# Patient Record
Sex: FEMALE | Race: WHITE | NOT HISPANIC OR LATINO | Employment: OTHER | URBAN - METROPOLITAN AREA
[De-identification: names, ages, dates, MRNs, and addresses within clinical notes are randomized per-mention and may not be internally consistent; named-entity substitution may affect disease eponyms.]

---

## 2021-08-25 ENCOUNTER — OFFICE VISIT (OUTPATIENT)
Dept: INTERNAL MEDICINE CLINIC | Facility: CLINIC | Age: 56
End: 2021-08-25
Payer: MEDICARE

## 2021-08-25 VITALS
OXYGEN SATURATION: 96 % | HEART RATE: 82 BPM | DIASTOLIC BLOOD PRESSURE: 68 MMHG | BODY MASS INDEX: 46.07 KG/M2 | SYSTOLIC BLOOD PRESSURE: 116 MMHG | HEIGHT: 61 IN | TEMPERATURE: 97.8 F | WEIGHT: 244 LBS

## 2021-08-25 DIAGNOSIS — Z00.00 MEDICARE ANNUAL WELLNESS VISIT, SUBSEQUENT: ICD-10-CM

## 2021-08-25 DIAGNOSIS — E78.2 MIXED HYPERLIPIDEMIA: Primary | ICD-10-CM

## 2021-08-25 DIAGNOSIS — F20.0 PARANOID SCHIZOPHRENIA, SUBCHRONIC CONDITION (HCC): ICD-10-CM

## 2021-08-25 DIAGNOSIS — M17.9 OSTEOARTHRITIS OF KNEE, UNSPECIFIED: ICD-10-CM

## 2021-08-25 DIAGNOSIS — R73.01 IMPAIRED FASTING GLUCOSE: ICD-10-CM

## 2021-08-25 DIAGNOSIS — N61.0 CELLULITIS OF LEFT BREAST: ICD-10-CM

## 2021-08-25 PROCEDURE — 99214 OFFICE O/P EST MOD 30 MIN: CPT | Performed by: INTERNAL MEDICINE

## 2021-08-25 PROCEDURE — G0438 PPPS, INITIAL VISIT: HCPCS | Performed by: INTERNAL MEDICINE

## 2021-08-25 RX ORDER — CEPHALEXIN 500 MG/1
500 CAPSULE ORAL EVERY 8 HOURS SCHEDULED
Qty: 21 CAPSULE | Refills: 0 | Status: SHIPPED | OUTPATIENT
Start: 2021-08-25 | End: 2021-09-01

## 2021-08-25 RX ORDER — LISINOPRIL 10 MG/1
10 TABLET ORAL DAILY
COMMUNITY
End: 2021-08-25

## 2021-08-25 NOTE — PROGRESS NOTES
Assessment and Plan:     Problem List Items Addressed This Visit        Endocrine    Impaired fasting glucose       Musculoskeletal and Integument    Osteoarthritis of knee, unspecified       Other    Paranoid schizophrenia, subchronic condition ([facility])    Relevant Medications    Invega Trinza 410 MG/1 315ML FLACO    paliperidone palmitate  MG/1 315ML FLACO    Mixed hyperlipidemia - Primary    Cellulitis of left breast    Relevant Medications    cephalexin (KEFLEX) 500 mg capsule    Medicare annual wellness visit, subsequent           Preventive health issues were discussed with patient, and age appropriate screening tests were ordered as noted in patient's After Visit Summary  Personalized health advice and appropriate referrals for health education or preventive services given if needed, as noted in patient's After Visit Summary       History of Present Illness:     Patient presents for Medicare Annual Wellness visit    No care team member to display     Problem List:     Patient Active Problem List   Diagnosis    Impaired fasting glucose    Osteoarthritis of knee, unspecified    Depression with anxiety    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[facility] )    Paranoid schizophrenia, subchronic condition ([facility] )    Mixed hyperlipidemia    Cellulitis of left breast    Medicare annual wellness visit, subsequent      Past Medical and Surgical History:     Past Medical History:   Diagnosis Date    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     Body mass index 50 0-59 9, adult ([facility] )     Depression with anxiety     Essential hypertension, benign     Impaired fasting glucose     Obesity due to excess calories     Osteoarthritis of knee, unspecified     Other hyperlipidemia     Other schizoaffective disorders ([facility] )     [facility]     Subchronic schizophrenia ([facility] )      Past Surgical History:   Procedure Laterality Date    NOSE SURGERY        Family History:     Family History   Problem Relation Age of Onset    Hyperlipidemia Sister       Social History:     Social History     Socioeconomic History    Marital status: Single     Spouse name: None    Number of children: None    Years of education: None    Highest education level: None   Occupational History    None   Tobacco Use    Smoking status: Never Smoker    Smokeless tobacco: Never Used   Substance and Sexual Activity    Alcohol use: Not Currently    Drug use: None    Sexual activity: None   Other Topics Concern    None   Social History Narrative    None     Social Determinants of Health     Financial Resource Strain:     Difficulty of Paying Living Expenses:    Food Insecurity:     Worried About Running Out of Food in the Last Year:     Ran Out of Food in the Last Year:    Transportation Needs:     Lack of Transportation (Medical):  Lack of Transportation (Non-Medical):    Physical Activity:     Days of Exercise per Week:     Minutes of Exercise per Session:    Stress:     Feeling of Stress :    Social Connections:     Frequency of Communication with Friends and Family:     Frequency of Social Gatherings with Friends and Family:     Attends [employer] Services:     Active Member of Clubs or Organizations:     Attends Club or Organization Meetings:     Marital Status:    Intimate Partner Violence:     Fear of Current or Ex-Partner:     Emotionally Abused:     Physically Abused:     Sexually Abused:       Medications and Allergies:     Current Outpatient Medications   Medication Sig Dispense Refill    Invega Trinza 410 MG/1 315ML FLACO       paliperidone palmitate  MG/1 315ML FLACO Inject 410 mg into a muscle      cephalexin (KEFLEX) 500 mg capsule Take 1 capsule (500 mg total) by mouth every 8 (eight) hours for 7 days 21 capsule 0    lisinopril (ZESTRIL) 10 mg tablet Take 10 mg by mouth daily (Patient not taking: Reported on 8/25/2021)       No current facility-administered medications for this visit       Allergies Allergen Reactions    Erythromycin Base Other (See Comments)     Unknown    Penicillins Other (See Comments)     Unknown      Immunizations:     Immunization History   Administered Date(s) Administered    Sars-cov-2 / Covid-19 vector-nr, [id number] vaccine Layman Fabienne / [employer] Blackburn & [employer]burn) 05/25/2021    Tdap 08/17/2011      Health Maintenance:         Topic Date Due    Hepatitis C Screening  Never done    HIV Screening  Never done    Cervical Cancer Screening  Never done    Breast Cancer Screening: Mammogram  Never done    Colorectal Cancer Screening  Never done         Topic Date Due    DTaP,Tdap,and Td Vaccines (2 - Td or Tdap) 08/17/2021    Influenza Vaccine (1) 09/01/2021      Medicare Health Risk Assessment:     /68 (BP Location: Right arm, Patient Position: Sitting, Cuff Size: Standard)   Pulse 82   Temp 97 8 °F (36 6 °C) (Temporal)   Ht 5' 1" (1 549 m)   Wt 111 kg (244 lb)   SpO2 96%   BMI 46 10 kg/m²      Flex Ceja is here for her Subsequent Wellness visit  Last Medicare Wellness visit information reviewed, patient interviewed and updates made to the record today  Health Risk Assessment:   Patient rates overall health as excellent  Patient feels that their physical health rating is same  Patient is very satisfied with their life  Eyesight was rated as same  Hearing was rated as same  Patient feels that their emotional and mental health rating is same  Patients states they are never, rarely angry  Patient states they are never, rarely unusually tired/fatigued  Pain experienced in the last 7 days has been some  Patient's pain rating has been 7/10  Patient states that she has experienced no weight loss or gain in last 6 months  Depression Screening:   PHQ-2 Score: 0      Fall Risk Screening: In the past year, patient has experienced: no history of falling in past year      Urinary Incontinence Screening:   Patient has not leaked urine accidently in the last six months       Home Safety:  Patient has trouble with stairs inside or outside of their home  Patient has working smoke alarms and has working carbon monoxide detector  Home safety hazards include: none  Nutrition:   Current diet is Low Saturated Fat and Regular  Medications:   Patient is currently taking over-the-counter supplements  OTC medications include: see medication list  Patient is able to manage medications  Activities of Daily Living (ADLs)/Instrumental Activities of Daily Living (IADLs):   Walk and transfer into and out of bed and chair?: Yes  Dress and groom yourself?: Yes    Bathe or shower yourself?: Yes    Feed yourself? Yes  Do your laundry/housekeeping?: Yes  Manage your money, pay your bills and track your expenses?: No  Make your own meals?: Yes    Do your own shopping?: Yes    Previous Hospitalizations:   Any hospitalizations or ED visits within the last 12 months?: No      Advance Care Planning:   Living will: No    Durable POA for healthcare: No    Advanced directive: No    Five wishes given: No      PREVENTIVE SCREENINGS      Cardiovascular Screening:    General: Screening Current      Lung Cancer Screening:     General: Screening Not Indicated    Screening, Brief Intervention, and Referral to Treatment (SBIRT)    Screening  Typical number of drinks in a day: 0  Typical number of drinks in a week: 0  Interpretation: Low risk drinking behavior      Single Item Drug Screening:  How often have you used an illegal drug (including marijuana) or a prescription medication for non-medical reasons in the past year? never    Single Item Drug Screen Score: 0  Interpretation: Negative screen for possible drug use disorder      Wagner King MD

## 2021-08-25 NOTE — PROGRESS NOTES
Assessment/Plan:    BMI Counseling: Body mass index is 46 1 kg/m²  The BMI is above normal  Nutrition recommendations include decreasing portion sizes, encouraging healthy choices of fruits and vegetables and decreasing fast food intake  Exercise recommendations include moderate physical activity 150 minutes/week  1  Mixed hyperlipidemia  -     Comprehensive metabolic panel; Future  -     Lipid Panel with Direct LDL reflex; Future  -     TSH, 3rd generation; Future    2  Impaired fasting glucose  -     CBC and differential; Future  -     Hemoglobin A1C; Future    3  Medicare annual wellness visit, subsequent    4  Paranoid schizophrenia, subchronic condition ([facility] )    5  Osteoarthritis of knee, unspecified    6  Cellulitis of left breast  -     cephalexin (KEFLEX) 500 mg capsule; Take 1 capsule (500 mg total) by mouth every 8 (eight) hours for 7 days           Subjective:      Patient ID: Sukhjinder Salazar is a 64 y o  female  Left breast skin check, no fever, also need medical wellness exam      The following portions of the patient's history were reviewed and updated as appropriate: She  has a past medical history of 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 ), Body mass index 50 0-59 9, adult ([facility] ), Depression with anxiety, Essential hypertension, benign, Impaired fasting glucose, Obesity due to excess calories, Osteoarthritis of knee, unspecified, Other hyperlipidemia, Other schizoaffective disorders ([facility] ), Paranoid schizophrenia, subchronic condition ([facility] ), and Subchronic schizophrenia ([facility] )    She   Patient Active Problem List    Diagnosis Date Noted    Mixed hyperlipidemia 08/25/2021    Cellulitis of left breast 08/25/2021    Medicare annual wellness visit, subsequent 08/25/2021    Impaired fasting glucose     Osteoarthritis of knee, unspecified     Depression with anxiety     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     Paranoid schizophrenia, subchronic condition ([facility] )      She  has a past surgical history that includes Nose surgery  Her family history includes Hyperlipidemia in her sister  She  reports that she has never smoked  She has never used smokeless tobacco  She reports previous alcohol use  No history on file for drug use  Current Outpatient Medications   Medication Sig Dispense Refill    Invega Trinza 410 MG/1 315ML FLACO       paliperidone palmitate  MG/1 315ML FLACO Inject 410 mg into a muscle      cephalexin (KEFLEX) 500 mg capsule Take 1 capsule (500 mg total) by mouth every 8 (eight) hours for 7 days 21 capsule 0     No current facility-administered medications for this visit  Current Outpatient Medications on File Prior to Visit   Medication Sig    Invega Trinza 410 MG/1 315ML FLACO     paliperidone palmitate  MG/1 315ML FLACO Inject 410 mg into a muscle    [DISCONTINUED] lisinopril (ZESTRIL) 10 mg tablet Take 10 mg by mouth daily (Patient not taking: Reported on 8/25/2021)     No current facility-administered medications on file prior to visit  She is allergic to erythromycin base and penicillins       Review of Systems   Constitutional: Negative for chills and fever  HENT: Negative for congestion, ear pain and sore throat  Eyes: Negative for pain  Respiratory: Negative for cough and shortness of breath  Cardiovascular: Negative for chest pain and leg swelling  Gastrointestinal: Negative for abdominal pain, nausea and vomiting  Endocrine: Negative for polyuria  Genitourinary: Negative for difficulty urinating, frequency and urgency  Musculoskeletal: Negative for arthralgias and back pain  Skin: Positive for rash  Neurological: Negative for weakness and headaches  Psychiatric/Behavioral: Negative for sleep disturbance  The patient is not nervous/anxious            Objective:      /68 (BP Location: Right arm, Patient Position: Sitting, Cuff Size: Standard)   Pulse 82   Temp 97 8 °F (36 6 °C) (Temporal)   Ht 5' 1" (1 549 m)   Wt 111 kg (244 lb)   SpO2 96%   BMI 46 10 kg/m²     No results found for this or any previous visit (from the past 1344 hour(s))  Physical Exam  Constitutional:       Appearance: Normal appearance  HENT:      Head: Normocephalic  Right Ear: Tympanic membrane, ear canal and external ear normal       Left Ear: Tympanic membrane, ear canal and external ear normal       Nose: Nose normal  No congestion  Mouth/Throat:      Mouth: Mucous membranes are moist       Pharynx: Oropharynx is clear  No oropharyngeal exudate or posterior oropharyngeal erythema  Eyes:      Extraocular Movements: Extraocular movements intact  Conjunctiva/sclera: Conjunctivae normal       Pupils: Pupils are equal, round, and reactive to light  Cardiovascular:      Rate and Rhythm: Normal rate and regular rhythm  Heart sounds: Normal heart sounds  No murmur heard  Pulmonary:      Effort: Pulmonary effort is normal       Breath sounds: Normal breath sounds  No wheezing or rales  Abdominal:      General: Bowel sounds are normal  There is no distension  Palpations: Abdomen is soft  Tenderness: There is no abdominal tenderness  Musculoskeletal:         General: Normal range of motion  Cervical back: Normal range of motion and neck supple  Right lower leg: No edema  Left lower leg: No edema  Lymphadenopathy:      Cervical: No cervical adenopathy  Skin:     General: Skin is warm  Comments: Left-side of the breast laterally 1 x 1 cm open wound present, dry, no active discharge, minimal redness present   Neurological:      General: No focal deficit present  Mental Status: She is alert and oriented to person, place, and time

## 2021-09-08 LAB
ALBUMIN SERPL-MCNC: 3.8 G/DL (ref 3.6–5.1)
ALBUMIN/GLOB SERPL: 1.3 (CALC) (ref 1–2.5)
ALP SERPL-CCNC: 78 U/L (ref 37–153)
ALT SERPL-CCNC: 15 U/L (ref 6–29)
AST SERPL-CCNC: 13 U/L (ref 10–35)
BASOPHILS # BLD AUTO: 33 CELLS/UL (ref 0–200)
BASOPHILS NFR BLD AUTO: 0.5 %
BILIRUB SERPL-MCNC: 0.6 MG/DL (ref 0.2–1.2)
BUN SERPL-MCNC: 16 MG/DL (ref 7–25)
BUN/CREAT SERPL: ABNORMAL (CALC) (ref 6–22)
CALCIUM SERPL-MCNC: 8.9 MG/DL (ref 8.6–10.4)
CHLORIDE SERPL-SCNC: 103 MMOL/L (ref 98–110)
CHOLEST SERPL-MCNC: 249 MG/DL
CHOLEST/HDLC SERPL: 6.9 (CALC)
CO2 SERPL-SCNC: 30 MMOL/L (ref 20–32)
CREAT SERPL-MCNC: 0.71 MG/DL (ref 0.5–1.05)
EOSINOPHIL # BLD AUTO: 211 CELLS/UL (ref 15–500)
EOSINOPHIL NFR BLD AUTO: 3.2 %
ERYTHROCYTE [DISTWIDTH] IN BLOOD BY AUTOMATED COUNT: 12.2 % (ref 11–15)
GLOBULIN SER CALC-MCNC: 2.9 G/DL (CALC) (ref 1.9–3.7)
GLUCOSE SERPL-MCNC: 113 MG/DL (ref 65–99)
HBA1C MFR BLD: 6.1 % OF TOTAL HGB
HCT VFR BLD AUTO: 40.8 % (ref 35–45)
HDLC SERPL-MCNC: 36 MG/DL
HGB BLD-MCNC: 13.7 G/DL (ref 11.7–15.5)
LDLC SERPL CALC-MCNC: 170 MG/DL (CALC)
LYMPHOCYTES # BLD AUTO: 2442 CELLS/UL (ref 850–3900)
LYMPHOCYTES NFR BLD AUTO: 37 %
MCH RBC QN AUTO: 29.7 PG (ref 27–33)
MCHC RBC AUTO-ENTMCNC: 33.6 G/DL (ref 32–36)
MCV RBC AUTO: 88.3 FL (ref 80–100)
MONOCYTES # BLD AUTO: 561 CELLS/UL (ref 200–950)
MONOCYTES NFR BLD AUTO: 8.5 %
NEUTROPHILS # BLD AUTO: 3353 CELLS/UL (ref 1500–7800)
NEUTROPHILS NFR BLD AUTO: 50.8 %
NONHDLC SERPL-MCNC: 213 MG/DL (CALC)
PLATELET # BLD AUTO: 304 THOUSAND/UL (ref 140–400)
PMV BLD REES-ECKER: 11.1 FL (ref 7.5–12.5)
POTASSIUM SERPL-SCNC: 4.7 MMOL/L (ref 3.5–5.3)
PROT SERPL-MCNC: 6.7 G/DL (ref 6.1–8.1)
RBC # BLD AUTO: 4.62 MILLION/UL (ref 3.8–5.1)
SL AMB EGFR AFRICAN AMERICAN: 110 ML/MIN/1.73M2
SL AMB EGFR NON AFRICAN AMERICAN: 95 ML/MIN/1.73M2
SODIUM SERPL-SCNC: 140 MMOL/L (ref 135–146)
TRIGL SERPL-MCNC: 265 MG/DL
TSH SERPL-ACNC: 3.11 MIU/L (ref 0.4–4.5)
WBC # BLD AUTO: 6.6 THOUSAND/UL (ref 3.8–10.8)

## 2021-09-15 ENCOUNTER — OFFICE VISIT (OUTPATIENT)
Dept: INTERNAL MEDICINE CLINIC | Facility: CLINIC | Age: 56
End: 2021-09-15
Payer: MEDICARE

## 2021-09-15 VITALS
SYSTOLIC BLOOD PRESSURE: 122 MMHG | BODY MASS INDEX: 45.31 KG/M2 | DIASTOLIC BLOOD PRESSURE: 78 MMHG | WEIGHT: 240 LBS | HEIGHT: 61 IN | TEMPERATURE: 97.7 F

## 2021-09-15 DIAGNOSIS — R73.01 IMPAIRED FASTING GLUCOSE: ICD-10-CM

## 2021-09-15 DIAGNOSIS — E78.2 MIXED HYPERLIPIDEMIA: Primary | ICD-10-CM

## 2021-09-15 DIAGNOSIS — F20.0 PARANOID SCHIZOPHRENIA, SUBCHRONIC CONDITION (HCC): ICD-10-CM

## 2021-09-15 DIAGNOSIS — M17.9 OSTEOARTHRITIS OF KNEE, UNSPECIFIED: ICD-10-CM

## 2021-09-15 PROCEDURE — 99214 OFFICE O/P EST MOD 30 MIN: CPT | Performed by: INTERNAL MEDICINE

## 2021-09-15 NOTE — PROGRESS NOTES
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[facility] ), Body mass index 50 0-59 9, [facility] ), Colon cancer screening declined, Depression with anxiety, Essential hypertension, benign, Hypertension, Impaired fasting glucose, Obesity due to excess calories, Osteoarthritis of knee, unspecified, Other hyperlipidemia, Other schizoaffective disorders ([facility] ), Paranoid schizophrenia, subchronic condition ([facility] ), Screening mammography declined, and Subchronic schizophrenia ([facility] )  She   Patient Active Problem List    Diagnosis Date Noted    Mixed hyperlipidemia 08/25/2021    Cellulitis of left breast 08/25/2021    Medicare annual wellness visit, subsequent 08/25/2021    Impaired fasting glucose     Osteoarthritis of knee, unspecified     Depression with anxiety     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[facility] )     Paranoid schizophrenia, subchronic condition ([facility] )      She  has a past surgical history that includes Nose surgery  Her family history includes Hyperlipidemia in her sister  She  reports that she has never smoked  She has never used smokeless tobacco  She reports previous alcohol use  No history on file for drug use  Current Outpatient Medications   Medication Sig Dispense Refill    Invega Trinza 410 MG/1 315ML FLACO        No current facility-administered medications for this visit       Current Outpatient Medications on File Prior to Visit   Medication Sig    Invega Trinza 410 MG/1 315ML FLACO     [DISCONTINUED] paliperidone palmitate  MG/1 315ML FLACO Inject 410 mg into a muscle     No current facility-administered medications on file prior to visit  She is allergic to erythromycin base and penicillins       Review of Systems   Constitutional: Negative for chills and fever  HENT: Negative for congestion, ear pain and sore throat  Eyes: Negative for pain  Respiratory: Negative for cough and shortness of breath  Cardiovascular: Negative for chest pain and leg swelling  Gastrointestinal: Negative for abdominal pain, nausea and vomiting  Endocrine: Negative for polyuria  Genitourinary: Negative for difficulty urinating, frequency and urgency  Musculoskeletal: Positive for arthralgias  Negative for back pain  Skin: Negative for rash  Neurological: Negative for weakness and headaches  Psychiatric/Behavioral: Negative for sleep disturbance  The patient is not nervous/anxious            Objective:      /78 (BP Location: Right arm, Patient Position: Sitting, Cuff Size: Standard)   Temp 97 7 °F (36 5 °C) (Temporal)   Ht 5' 1" (1 549 m)   Wt 109 kg (240 lb)   BMI 45 35 kg/m²     Recent Results (from the past 1344 hour(s))   Lipid Panel with Direct LDL reflex    Collection Time: 09/07/21  8:17 AM   Result Value Ref Range    Total Cholesterol 249 (H) <200 mg/dL    HDL 36 (L) > OR = 50 mg/dL    Triglycerides 265 (H) <150 mg/dL    LDL Calculated 170 (H) mg/dL (calc)    Chol HDLC Ratio 6 9 (H) <5 0 (calc)    Non-HDL Cholesterol 213 (H) <130 mg/dL (calc)   Comprehensive metabolic panel    Collection Time: 09/07/21  8:17 AM   Result Value Ref Range    Glucose, Random 113 (H) 65 - 99 mg/dL    BUN 16 7 - 25 mg/dL    Creatinine 0 71 0 50 - 1 05 mg/dL    eGFR Non  95 > OR = 60 mL/min/1 73m2    eGFR  110 > OR = 60 mL/min/1 73m2    SL AMB BUN/CREATININE RATIO NOT APPLICABLE 6 - 22 (calc)    Sodium 140 135 - 146 mmol/L    Potassium 4 7 3 5 - 5 3 mmol/L    Chloride 103 98 - 110 mmol/L    CO2 30 20 - 32 mmol/L    Calcium 8 9 8 6 - 10 4 mg/dL Protein, Total 6 7 6 1 - 8 1 g/dL    Albumin 3 8 3 6 - 5 1 g/dL    Globulin 2 9 1 9 - 3 7 g/dL (calc)    Albumin/Globulin Ratio 1 3 1 0 - 2 5 (calc)    TOTAL BILIRUBIN 0 6 0 2 - 1 2 mg/dL    Alkaline Phosphatase 78 37 - 153 U/L    AST 13 10 - 35 U/L    ALT 15 6 - 29 U/L   CBC and differential    Collection Time: 09/07/21  8:17 AM   Result Value Ref Range    White Blood Cell Count 6 6 3 8 - 10 8 Thousand/uL    Red Blood Cell Count 4 62 3 80 - 5 10 Million/uL    Hemoglobin 13 7 11 7 - 15 5 g/dL    HCT 40 8 35 0 - 45 0 %    MCV 88 3 80 0 - 100 0 fL    MCH 29 7 27 0 - 33 0 pg    MCHC 33 6 32 0 - 36 0 g/dL    RDW 12 2 11 0 - 15 0 %    Platelet Count 460 664 - 400 Thousand/uL    SL AMB MPV 11 1 7 5 - 12 5 fL    Neutrophils (Absolute) 3,353 1,500 - 7,800 cells/uL    Lymphocytes (Absolute) 2,442 850 - 3,900 cells/uL    Monocytes (Absolute) 561 200 - 950 cells/uL    Eosinophils (Absolute) 211 15 - 500 cells/uL    Basophils ABS 33 0 - 200 cells/uL    Neutrophils 50 8 %    Lymphocytes 37 0 %    Monocytes 8 5 %    Eosinophils 3 2 %    Basophils PCT 0 5 %   TSH, 3rd generation    Collection Time: 09/07/21  8:17 AM   Result Value Ref Range    TSH 3 11 0 40 - 4 50 mIU/L   Hemoglobin A1c (w/out EAG)    Collection Time: 09/07/21  8:17 AM   Result Value Ref Range    Hemoglobin A1C 6 1 (H) <5 7 % of total Hgb        Physical Exam  Constitutional:       Appearance: Normal appearance  HENT:      Head: Normocephalic  Right Ear: Tympanic membrane, ear canal and external ear normal       Left Ear: Tympanic membrane, ear canal and external ear normal       Nose: Nose normal  No congestion  Mouth/Throat:      Mouth: Mucous membranes are moist       Pharynx: Oropharynx is clear  No oropharyngeal exudate or posterior oropharyngeal erythema  Eyes:      Extraocular Movements: Extraocular movements intact  Conjunctiva/sclera: Conjunctivae normal       Pupils: Pupils are equal, round, and reactive to light     Cardiovascular: Rate and Rhythm: Normal rate and regular rhythm  Heart sounds: Normal heart sounds  No murmur heard  Pulmonary:      Effort: Pulmonary effort is normal       Breath sounds: Normal breath sounds  No wheezing or rales  Abdominal:      General: Bowel sounds are normal  There is no distension  Palpations: Abdomen is soft  Tenderness: There is no abdominal tenderness  Musculoskeletal:         General: Normal range of motion  Cervical back: Normal range of motion and neck supple  Right lower leg: No edema  Left lower leg: No edema  Lymphadenopathy:      Cervical: No cervical adenopathy  Skin:     General: Skin is warm  Neurological:      General: No focal deficit present  Mental Status: She is alert and oriented to person, place, and time

## 2021-11-09 ENCOUNTER — OFFICE VISIT (OUTPATIENT)
Dept: INTERNAL MEDICINE CLINIC | Facility: CLINIC | Age: 56
End: 2021-11-09
Payer: MEDICARE

## 2021-11-09 VITALS
BODY MASS INDEX: 46.33 KG/M2 | DIASTOLIC BLOOD PRESSURE: 78 MMHG | SYSTOLIC BLOOD PRESSURE: 134 MMHG | TEMPERATURE: 97.9 F | HEIGHT: 61 IN | WEIGHT: 245.4 LBS | OXYGEN SATURATION: 96 % | HEART RATE: 97 BPM

## 2021-11-09 DIAGNOSIS — F20.0 PARANOID SCHIZOPHRENIA, SUBCHRONIC CONDITION (HCC): ICD-10-CM

## 2021-11-09 DIAGNOSIS — E78.2 MIXED HYPERLIPIDEMIA: ICD-10-CM

## 2021-11-09 DIAGNOSIS — M17.9 OSTEOARTHRITIS OF KNEE, UNSPECIFIED: ICD-10-CM

## 2021-11-09 DIAGNOSIS — M79.601 RIGHT ARM PAIN: Primary | ICD-10-CM

## 2021-11-09 DIAGNOSIS — R73.01 IMPAIRED FASTING GLUCOSE: ICD-10-CM

## 2021-11-09 DIAGNOSIS — E66.01 OBESITY, MORBID (HCC): ICD-10-CM

## 2021-11-09 DIAGNOSIS — Z12.31 ENCOUNTER FOR SCREENING MAMMOGRAM FOR MALIGNANT NEOPLASM OF BREAST: ICD-10-CM

## 2021-11-09 PROCEDURE — 99214 OFFICE O/P EST MOD 30 MIN: CPT | Performed by: INTERNAL MEDICINE

## 2022-02-22 ENCOUNTER — OFFICE VISIT (OUTPATIENT)
Dept: INTERNAL MEDICINE CLINIC | Facility: CLINIC | Age: 57
End: 2022-02-22
Payer: MEDICARE

## 2022-02-22 VITALS
TEMPERATURE: 97.9 F | HEIGHT: 61 IN | DIASTOLIC BLOOD PRESSURE: 74 MMHG | HEART RATE: 83 BPM | OXYGEN SATURATION: 98 % | SYSTOLIC BLOOD PRESSURE: 138 MMHG | WEIGHT: 257 LBS | BODY MASS INDEX: 48.52 KG/M2

## 2022-02-22 DIAGNOSIS — R73.01 IMPAIRED FASTING GLUCOSE: ICD-10-CM

## 2022-02-22 DIAGNOSIS — E66.01 OBESITY, MORBID (HCC): ICD-10-CM

## 2022-02-22 DIAGNOSIS — F20.0 PARANOID SCHIZOPHRENIA, SUBCHRONIC CONDITION (HCC): ICD-10-CM

## 2022-02-22 DIAGNOSIS — E78.2 MIXED HYPERLIPIDEMIA: Primary | ICD-10-CM

## 2022-02-22 PROCEDURE — 99214 OFFICE O/P EST MOD 30 MIN: CPT | Performed by: INTERNAL MEDICINE

## 2022-02-22 NOTE — PROGRESS NOTES
Assessment/Plan:    BMI Counseling: Body mass index is 48 56 kg/m²  The BMI is above normal  Nutrition recommendations include decreasing portion sizes, encouraging healthy choices of fruits and vegetables and decreasing fast food intake  Exercise recommendations include moderate physical activity 150 minutes/week  Rationale for BMI follow-up plan is due to patient being overweight or obese  1  Mixed hyperlipidemia  -     Comprehensive metabolic panel; Future  -     Lipid Panel with Direct LDL reflex; Future  -     TSH, 3rd generation; Future    2  Impaired fasting glucose  -     CBC and differential; Future  -     Hemoglobin A1C; Future    3  Paranoid schizophrenia, subchronic condition ([place] [address] )    4  Obesity, morbid ([place] [address] )    5  Body mass index 45 0-49 9, adult ([facility])           Subjective:      Patient ID: Janie Red is a 64 y o  female  Follow-up on multiple medical problems to ensure the stable      The following portions of the patient's history were reviewed and updated as appropriate: She  has a past medical history of 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[place] [address] ), Body mass index 50 0-59 9, adult ([place] [address] ), Colon cancer screening declined, Depression with anxiety, Essential hypertension, benign, Hypertension, Impaired fasting glucose, Obesity due to excess calories, Osteoarthritis of knee, unspecified, Other hyperlipidemia, Other schizoaffective disorders ([place] [address] ), Paranoid schizophrenia, subchronic condition ([place] [address] ), Screening mammography declined, and Subchronic schizophrenia ([place] [address] )    She   Patient Active Problem List    Diagnosis Date Noted    Body mass index 45 0-49 9, adult ([place] [address] ) 02/22/2022    Right arm pain 11/09/2021    Obesity, morbid ([place] [address] ) 11/09/2021    Mixed hyperlipidemia 08/25/2021    Cellulitis of left breast 08/25/2021    Encounter for screening mammogram for malignant neoplasm of breast 08/25/2021    Impaired fasting glucose     Osteoarthritis of knee, unspecified     Depression with anxiety     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     Paranoid schizophrenia, subchronic condition ([facility] )      She  has a past surgical history that includes Nose surgery  Her family history includes Hyperlipidemia in her sister  She  reports that she has never smoked  She has never used smokeless tobacco  She reports previous alcohol use  She reports that she does not use drugs  Current Outpatient Medications   Medication Sig Dispense Refill    Invega Trinza 410 MG/1 315ML FLACO every 3 (three) months         No current facility-administered medications for this visit  Current Outpatient Medications on File Prior to Visit   Medication Sig    Invega Trinza 410 MG/1 315ML FLACO every 3 (three) months       No current facility-administered medications on file prior to visit  She is allergic to erythromycin base and penicillins       Review of Systems   Constitutional: Negative for chills and fever  HENT: Negative for congestion, ear pain and sore throat  Eyes: Negative for pain  Respiratory: Negative for cough and shortness of breath  Cardiovascular: Negative for chest pain and leg swelling  Gastrointestinal: Negative for abdominal pain, nausea and vomiting  Endocrine: Negative for polyuria  Genitourinary: Negative for difficulty urinating, frequency and urgency  Musculoskeletal: Positive for arthralgias  Negative for back pain  Skin: Negative for rash  Neurological: Negative for weakness and headaches  Psychiatric/Behavioral: Negative for sleep disturbance  The patient is not nervous/anxious  Objective:      /74 (BP Location: Left arm, Patient Position: Sitting, Cuff Size: Large)   Pulse 83   Temp 97 9 °F (36 6 °C) (Temporal)   Ht 5' 1" (1 549 m)   Wt 117 kg (257 lb)   SpO2 98%   BMI 48 56 kg/m²     No results found for this or any previous visit (from the past 1344 hour(s))  Physical Exam  Constitutional:       Appearance: Normal appearance  HENT:      Head: Normocephalic  Right Ear: Tympanic membrane, ear canal and external ear normal       Left Ear: Tympanic membrane, ear canal and external ear normal       Nose: Nose normal  No congestion  Mouth/Throat:      Mouth: Mucous membranes are moist       Pharynx: Oropharynx is clear  No oropharyngeal exudate or posterior oropharyngeal erythema  Eyes:      Extraocular Movements: Extraocular movements intact  Conjunctiva/sclera: Conjunctivae normal       Pupils: Pupils are equal, round, and reactive to light  Cardiovascular:      Rate and Rhythm: Normal rate and regular rhythm  Heart sounds: Normal heart sounds  No murmur heard  Pulmonary:      Effort: Pulmonary effort is normal       Breath sounds: Normal breath sounds  No wheezing or rales  Abdominal:      General: Bowel sounds are normal  There is no distension  Palpations: Abdomen is soft  Tenderness: There is no abdominal tenderness  Musculoskeletal:         General: Normal range of motion  Cervical back: Normal range of motion and neck supple  Right lower leg: No edema  Left lower leg: No edema  Lymphadenopathy:      Cervical: No cervical adenopathy  Skin:     General: Skin is warm  Neurological:      General: No focal deficit present  Mental Status: She is alert and oriented to person, place, and time

## 2022-05-06 LAB
ALBUMIN SERPL-MCNC: 3.9 G/DL (ref 3.6–5.1)
ALBUMIN/GLOB SERPL: 1.2 (CALC) (ref 1–2.5)
ALP SERPL-CCNC: 87 U/L (ref 37–153)
ALT SERPL-CCNC: 14 U/L (ref 6–29)
AST SERPL-CCNC: 12 U/L (ref 10–35)
BASOPHILS # BLD AUTO: 30 CELLS/UL (ref 0–200)
BASOPHILS NFR BLD AUTO: 0.4 %
BILIRUB SERPL-MCNC: 0.4 MG/DL (ref 0.2–1.2)
BUN SERPL-MCNC: 14 MG/DL (ref 7–25)
BUN/CREAT SERPL: ABNORMAL (CALC) (ref 6–22)
CALCIUM SERPL-MCNC: 9 MG/DL (ref 8.6–10.4)
CHLORIDE SERPL-SCNC: 104 MMOL/L (ref 98–110)
CHOLEST SERPL-MCNC: 256 MG/DL
CHOLEST/HDLC SERPL: 6.6 (CALC)
CO2 SERPL-SCNC: 29 MMOL/L (ref 20–32)
CREAT SERPL-MCNC: 0.64 MG/DL (ref 0.5–1.05)
EOSINOPHIL # BLD AUTO: 143 CELLS/UL (ref 15–500)
EOSINOPHIL NFR BLD AUTO: 1.9 %
ERYTHROCYTE [DISTWIDTH] IN BLOOD BY AUTOMATED COUNT: 11.8 % (ref 11–15)
GLOBULIN SER CALC-MCNC: 3.2 G/DL (CALC) (ref 1.9–3.7)
GLUCOSE SERPL-MCNC: 123 MG/DL (ref 65–99)
HBA1C MFR BLD: 6.1 % OF TOTAL HGB
HCT VFR BLD AUTO: 38.7 % (ref 35–45)
HDLC SERPL-MCNC: 39 MG/DL
HGB BLD-MCNC: 13.2 G/DL (ref 11.7–15.5)
LDLC SERPL CALC-MCNC: 175 MG/DL (CALC)
LYMPHOCYTES # BLD AUTO: 2018 CELLS/UL (ref 850–3900)
LYMPHOCYTES NFR BLD AUTO: 26.9 %
MCH RBC QN AUTO: 29.5 PG (ref 27–33)
MCHC RBC AUTO-ENTMCNC: 34.1 G/DL (ref 32–36)
MCV RBC AUTO: 86.4 FL (ref 80–100)
MONOCYTES # BLD AUTO: 615 CELLS/UL (ref 200–950)
MONOCYTES NFR BLD AUTO: 8.2 %
NEUTROPHILS # BLD AUTO: 4695 CELLS/UL (ref 1500–7800)
NEUTROPHILS NFR BLD AUTO: 62.6 %
NONHDLC SERPL-MCNC: 217 MG/DL (CALC)
PLATELET # BLD AUTO: 305 THOUSAND/UL (ref 140–400)
PMV BLD REES-ECKER: 10.5 FL (ref 7.5–12.5)
POTASSIUM SERPL-SCNC: 4.4 MMOL/L (ref 3.5–5.3)
PROT SERPL-MCNC: 7.1 G/DL (ref 6.1–8.1)
RBC # BLD AUTO: 4.48 MILLION/UL (ref 3.8–5.1)
SL AMB EGFR AFRICAN AMERICAN: 115 ML/MIN/1.73M2
SL AMB EGFR NON AFRICAN AMERICAN: 99 ML/MIN/1.73M2
SODIUM SERPL-SCNC: 140 MMOL/L (ref 135–146)
TRIGL SERPL-MCNC: 246 MG/DL
TSH SERPL-ACNC: 2.92 MIU/L (ref 0.4–4.5)
WBC # BLD AUTO: 7.5 THOUSAND/UL (ref 3.8–10.8)

## 2022-05-12 ENCOUNTER — OFFICE VISIT (OUTPATIENT)
Dept: INTERNAL MEDICINE CLINIC | Facility: CLINIC | Age: 57
End: 2022-05-12
Payer: MEDICARE

## 2022-05-12 VITALS
HEART RATE: 76 BPM | OXYGEN SATURATION: 97 % | WEIGHT: 253 LBS | SYSTOLIC BLOOD PRESSURE: 112 MMHG | DIASTOLIC BLOOD PRESSURE: 72 MMHG | TEMPERATURE: 97.8 F | BODY MASS INDEX: 47.77 KG/M2 | HEIGHT: 61 IN

## 2022-05-12 DIAGNOSIS — E78.2 MIXED HYPERLIPIDEMIA: ICD-10-CM

## 2022-05-12 DIAGNOSIS — F20.0 PARANOID SCHIZOPHRENIA, SUBCHRONIC CONDITION (HCC): ICD-10-CM

## 2022-05-12 DIAGNOSIS — Z53.20 MAMMOGRAM DECLINED: ICD-10-CM

## 2022-05-12 DIAGNOSIS — Z53.20 COLON CANCER SCREENING DECLINED: ICD-10-CM

## 2022-05-12 DIAGNOSIS — E66.01 OBESITY, MORBID (HCC): ICD-10-CM

## 2022-05-12 DIAGNOSIS — R73.01 IMPAIRED FASTING GLUCOSE: Primary | ICD-10-CM

## 2022-05-12 PROCEDURE — 99214 OFFICE O/P EST MOD 30 MIN: CPT | Performed by: INTERNAL MEDICINE

## 2022-05-12 NOTE — PROGRESS NOTES
Assessment/Plan:             1  Impaired fasting glucose  -     CBC and differential; Future  -     Hemoglobin A1C; Future    2  Mixed hyperlipidemia  -     Comprehensive metabolic panel; Future  -     Lipid Panel with Direct LDL reflex; Future  -     TSH, 3rd generation; Future    3  Paranoid schizophrenia, subchronic condition ([facility] )    4  Obesity, morbid ([facility] )    5  Mammogram declined    6  Colon cancer screening declined           Subjective:      Patient ID: Bernard Kwok is a 62 y o  female  Follow-up on blood test done on 05/05/2022 test discussed with her      The following portions of the patient's history were reviewed and updated as appropriate: She  has a past medical history of 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[facility] ), Body mass index 50 0-59 9, adult ([facility] ), Colon cancer screening declined, Depression with anxiety, Essential hypertension, benign, Hypertension, Impaired fasting glucose, Obesity due to excess calories, Osteoarthritis of knee, unspecified, Other hyperlipidemia, Other schizoaffective disorders ([facility] ), Paranoid schizophrenia, subchronic condition ([facility] ), Screening mammography declined, and Subchronic schizophrenia ([facility] )  She   Patient Active Problem List    Diagnosis Date Noted    Colon cancer screening declined 05/12/2022    Body mass index 45 0-49 9, adult ([facility] ) 02/22/2022    Right arm pain 11/09/2021    [facility], morbid ([facility] ) 11/09/2021    Mixed hyperlipidemia 08/25/2021    Cellulitis of left breast 08/25/2021    Encounter for screening mammogram for malignant neoplasm of breast 08/25/2021    Impaired fasting glucose     Osteoarthritis of knee, unspecified     Depression with anxiety     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[facility] )     Paranoid schizophrenia, subchronic condition ([facility] )      She  has a past surgical history that includes Nose surgery  Her family history includes Hyperlipidemia in her sister  She  reports that she has never smoked   She has never used smokeless tobacco  She reports previous alcohol use  She reports that she does not use drugs  Current Outpatient Medications   Medication Sig Dispense Refill    Invega Trinza 410 MG/1 315ML FLACO every 3 (three) months         No current facility-administered medications for this visit  Current Outpatient Medications on File Prior to Visit   Medication Sig    Invega Trinza 410 MG/1 315ML FLACO every 3 (three) months       No current facility-administered medications on file prior to visit  She is allergic to erythromycin base and penicillins       Review of Systems   Constitutional: Negative for chills and fever  HENT: Negative for congestion, ear pain and sore throat  Eyes: Negative for pain  Respiratory: Negative for cough and shortness of breath  Cardiovascular: Negative for chest pain and leg swelling  Gastrointestinal: Negative for abdominal pain, nausea and vomiting  Endocrine: Negative for polyuria  Genitourinary: Negative for difficulty urinating, frequency and urgency  Musculoskeletal: Negative for arthralgias and back pain  Skin: Negative for rash  Neurological: Negative for weakness and headaches  Psychiatric/Behavioral: Negative for sleep disturbance  The patient is not nervous/anxious            Objective:      /72 (BP Location: Right arm, Patient Position: Sitting, Cuff Size: Standard)   Pulse 76   Temp 97 8 °F (36 6 °C) (Temporal)   Ht 5' 1" (1 549 m)   Wt 115 kg (253 lb)   SpO2 97%   BMI 47 80 kg/m²     Recent Results (from the past 1344 hour(s))   Lipid Panel with Direct LDL reflex    Collection Time: 05/05/22  9:19 AM   Result Value Ref Range    Total Cholesterol 256 (H) <200 mg/dL    HDL 39 (L) > OR = 50 mg/dL    Triglycerides 246 (H) <150 mg/dL    LDL Calculated 175 (H) mg/dL (calc)    Chol HDLC Ratio 6 6 (H) <5 0 (calc)    Non-HDL Cholesterol 217 (H) <130 mg/dL (calc)   Comprehensive metabolic panel    Collection Time: 05/05/22  9:19 AM   Result Value Ref Range    Glucose, Random 123 (H) 65 - 99 mg/dL    BUN 14 7 - 25 mg/dL    Creatinine 0 64 0 50 - 1 05 mg/dL    eGFR Non  99 > OR = 60 mL/min/1 73m2    eGFR  115 > OR = 60 mL/min/1 73m2    SL AMB BUN/CREATININE RATIO NOT APPLICABLE 6 - 22 (calc)    Sodium 140 135 - 146 mmol/L    Potassium 4 4 3 5 - 5 3 mmol/L    Chloride 104 98 - 110 mmol/L    CO2 29 20 - 32 mmol/L    Calcium 9 0 8 6 - 10 4 mg/dL    Protein, Total 7 1 6 1 - 8 1 g/dL    Albumin 3 9 3 6 - 5 1 g/dL    Globulin 3 2 1 9 - 3 7 g/dL (calc)    Albumin/Globulin Ratio 1 2 1 0 - 2 5 (calc)    TOTAL BILIRUBIN 0 4 0 2 - 1 2 mg/dL    Alkaline Phosphatase 87 37 - 153 U/L    AST 12 10 - 35 U/L    ALT 14 6 - 29 U/L   CBC and differential    Collection Time: 05/05/22  9:19 AM   Result Value Ref Range    White Blood Cell Count 7 5 3 8 - 10 8 Thousand/uL    Red Blood Cell Count 4 48 3 80 - 5 10 Million/uL    Hemoglobin 13 2 11 7 - 15 5 g/dL    HCT 38 7 35 0 - 45 0 %    MCV 86 4 80 0 - 100 0 fL    MCH 29 5 27 0 - 33 0 pg    MCHC 34 1 32 0 - 36 0 g/dL    RDW 11 8 11 0 - 15 0 %    Platelet Count 054 244 - 400 Thousand/uL    SL AMB MPV 10 5 7 5 - 12 5 fL    Neutrophils (Absolute) 4,695 1,500 - 7,800 cells/uL    Lymphocytes (Absolute) 2,018 850 - 3,900 cells/uL    Monocytes (Absolute) 615 200 - 950 cells/uL    Eosinophils (Absolute) 143 15 - 500 cells/uL    Basophils ABS 30 0 - 200 cells/uL    Neutrophils 62 6 %    Lymphocytes 26 9 %    Monocytes 8 2 %    Eosinophils 1 9 %    Basophils PCT 0 4 %   TSH, 3rd generation    Collection Time: 05/05/22  9:19 AM   Result Value Ref Range    TSH 2 92 0 40 - 4 50 mIU/L   Hemoglobin A1c (w/out EAG)    Collection Time: 05/05/22  9:19 AM   Result Value Ref Range    Hemoglobin A1C 6 1 (H) <5 7 % of total Hgb        Physical Exam  Constitutional:       Appearance: Normal appearance  HENT:      Head: Normocephalic        Right Ear: Tympanic membrane, ear canal and external ear normal       Left Ear: Tympanic membrane, ear canal and external ear normal       Nose: Nose normal  No congestion  Mouth/Throat:      Mouth: Mucous membranes are moist       Pharynx: Oropharynx is clear  No oropharyngeal exudate or posterior oropharyngeal erythema  Eyes:      Extraocular Movements: Extraocular movements intact  Conjunctiva/sclera: Conjunctivae normal       Pupils: Pupils are equal, round, and reactive to light  Cardiovascular:      Rate and Rhythm: Normal rate and regular rhythm  Heart sounds: Normal heart sounds  No murmur heard  Pulmonary:      Effort: Pulmonary effort is normal       Breath sounds: Normal breath sounds  No wheezing or rales  Abdominal:      General: Bowel sounds are normal  There is no distension  Palpations: Abdomen is soft  Tenderness: There is no abdominal tenderness  Musculoskeletal:         General: Normal range of motion  Cervical back: Normal range of motion and neck supple  Right lower leg: No edema  Left lower leg: No edema  Lymphadenopathy:      Cervical: No cervical adenopathy  Skin:     General: Skin is warm  Neurological:      General: No focal deficit present  Mental Status: She is alert and oriented to person, place, and time

## 2022-12-14 LAB — HBA1C MFR BLD HPLC: 6.4 %

## 2022-12-20 NOTE — PROGRESS NOTES
Called patient  does not have a drivers licence she will have to call her sister to see if she can bring her she will have sister Call and make appointment for her

## 2023-01-11 ENCOUNTER — OFFICE VISIT (OUTPATIENT)
Dept: INTERNAL MEDICINE CLINIC | Facility: CLINIC | Age: 58
End: 2023-01-11

## 2023-01-11 ENCOUNTER — TELEPHONE (OUTPATIENT)
Dept: INTERNAL MEDICINE CLINIC | Facility: CLINIC | Age: 58
End: 2023-01-11

## 2023-01-11 VITALS
OXYGEN SATURATION: 99 % | BODY MASS INDEX: 46.63 KG/M2 | WEIGHT: 247 LBS | SYSTOLIC BLOOD PRESSURE: 124 MMHG | HEART RATE: 87 BPM | TEMPERATURE: 97.9 F | DIASTOLIC BLOOD PRESSURE: 86 MMHG | HEIGHT: 61 IN

## 2023-01-11 DIAGNOSIS — Z12.31 ENCOUNTER FOR SCREENING MAMMOGRAM FOR MALIGNANT NEOPLASM OF BREAST: ICD-10-CM

## 2023-01-11 DIAGNOSIS — E66.01 OBESITY, MORBID (HCC): ICD-10-CM

## 2023-01-11 DIAGNOSIS — E78.2 MIXED HYPERLIPIDEMIA: Primary | ICD-10-CM

## 2023-01-11 DIAGNOSIS — F20.0 PARANOID SCHIZOPHRENIA, SUBCHRONIC CONDITION (HCC): ICD-10-CM

## 2023-01-11 DIAGNOSIS — Z53.20 COLON CANCER SCREENING DECLINED: ICD-10-CM

## 2023-01-11 DIAGNOSIS — R73.01 IMPAIRED FASTING GLUCOSE: ICD-10-CM

## 2023-01-11 DIAGNOSIS — Z00.00 MEDICARE ANNUAL WELLNESS VISIT, SUBSEQUENT: ICD-10-CM

## 2023-01-11 DIAGNOSIS — Z53.20 MAMMOGRAM DECLINED: ICD-10-CM

## 2023-01-11 DIAGNOSIS — Z12.11 SCREENING FOR COLON CANCER: ICD-10-CM

## 2023-01-11 NOTE — PROGRESS NOTES
Assessment and Plan:     Problem List Items Addressed This Visit        Endocrine    Impaired fasting glucose       Other    Paranoid schizophrenia, subchronic condition ([place] [address] )    Mixed hyperlipidemia - Primary    Medicare annual wellness visit, subsequent    Obesity, morbid ([place] [address] )    Colon cancer screening declined   Other Visit Diagnoses     Screening for colon cancer        Mammogram declined            BMI Counseling: Body mass index is 46 67 kg/m²  The BMI is above normal  Nutrition recommendations include decreasing portion sizes, encouraging healthy choices of fruits and vegetables and decreasing fast food intake  Exercise recommendations include moderate physical activity 150 minutes/week  Rationale for BMI follow-up plan is due to patient being overweight or obese  Preventive health issues were discussed with patient, and age appropriate screening tests were ordered as noted in patient's After Visit Summary  Personalized health advice and appropriate referrals for health education or preventive services given if needed, as noted in patient's After Visit Summary       History of Present Illness:     Patient presents for a Medicare Wellness Visit    Follow-up on blood test done on 12/14/2022 test discussed with her, also medical wellness exam     No care team member to display     Review of Systems:     Review of Systems     Problem List:     Patient Active Problem List   Diagnosis   • Impaired fasting glucose   • Osteoarthritis of knee, unspecified   • Depression with anxiety   • 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[facility] )   • Paranoid schizophrenia, subchronic condition ([place] [address] )   • Mixed hyperlipidemia   • Cellulitis of left breast   • Medicare annual wellness visit, subsequent   • Right arm pain   • Obesity, morbid ([facility] )   • Body mass index 45 0-49 9, adult ([facility] )   • Colon cancer screening declined      Past Medical and Surgical History:     Past Medical History:   Diagnosis Date   • 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[facility] )    • Body mass index 50 0-59 9, adult ([facility])    • Colon cancer screening declined    • Depression with anxiety    • Essential hypertension, benign    • Hypertension    • Impaired fasting glucose    • Obesity due to excess calories    • Osteoarthritis of knee, unspecified    • Other hyperlipidemia    • Other schizoaffective disorders ([facility])    • Paranoid schizophrenia, subchronic condition ([facility] )    • Screening mammography declined    • Subchronic schizophrenia ([facility] )      Past Surgical History:   Procedure Laterality Date   • NOSE SURGERY        Family History:     Family History   Problem Relation Age of Onset   • Hyperlipidemia Sister       Social History:     Social History     Socioeconomic History   • Marital status: Single     Spouse name: None   • Number of children: None   • Years of education: None   • Highest education level: None   Occupational History   • None   Tobacco Use   • Smoking status: Never   • Smokeless tobacco: Never   Vaping Use   • Vaping Use: Never used   Substance and Sexual Activity   • Alcohol use: Not Currently   • Drug use: Never   • Sexual activity: Not Currently   Other Topics Concern   • None   Social History Narrative   • None     Social Determinants of Health     Financial Resource Strain: Low Risk    • Difficulty of Paying Living Expenses: Not very hard   Food Insecurity: Not on file   Transportation Needs: No Transportation Needs   • Lack of Transportation (Medical): No   • Lack of Transportation (Non-Medical): No   Physical Activity: Not on file   Stress: Not on file   Social Connections: Not on file   Intimate Partner Violence: Not on file   Housing Stability: Not on file      Medications and Allergies:     Current Outpatient Medications   Medication Sig Dispense Refill   • Invega Trinza 410 MG/1 315ML FLACO every 3 (three) months   (Patient not taking: Reported on 1/11/2023)       No current facility-administered medications for this visit       Allergies   Allergen Reactions   • Erythromycin Base Other (See Comments)     Unknown   • Penicillins Other (See Comments)     Unknown      Immunizations:     Immunization History   Administered Date(s) Administered   • COVID-19 J&J (Simeon) vaccine 0 5 mL 05/25/2021, 11/04/2021   • Tdap 08/17/2011      Health Maintenance:         Topic Date Due   • Hepatitis C Screening  Never done   • HIV Screening  Never done   • Cervical Cancer Screening  Never done   • Breast Cancer Screening: Mammogram  Never done   • Colorectal Cancer Screening  Never done         Topic Date Due   • COVID-19 Vaccine (3 - Booster for Simeon series) 12/30/2021   • Influenza Vaccine (1) Never done      Medicare Screening Tests and Risk Assessments: Sunnie Hammans is here for her Subsequent Wellness visit  Health Risk Assessment:   Patient rates overall health as excellent  Patient feels that their physical health rating is same  Patient is satisfied with their life  Eyesight was rated as same  Hearing was rated as same  Patient feels that their emotional and mental health rating is slightly better  Patients states they are never, rarely angry  Patient states they are never, rarely unusually tired/fatigued  Pain experienced in the last 7 days has been none  Patient states that she has experienced no weight loss or gain in last 6 months  Fall Risk Screening: In the past year, patient has experienced: no history of falling in past year      Urinary Incontinence Screening:   Patient has not leaked urine accidently in the last six months  Home Safety:  Patient does not have trouble with stairs inside or outside of their home  Patient has working smoke alarms and has working carbon monoxide detector  Home safety hazards include: none  Nutrition:   Current diet is Other (please comment) and Low Cholesterol  No sugar, doing a more healthy diet      Medications:   Patient is currently taking over-the-counter supplements   OTC medications include: see medication list  Patient is not able to manage medications  Activities of Daily Living (ADLs)/Instrumental Activities of Daily Living (IADLs):   Walk and transfer into and out of bed and chair?: Yes  Dress and groom yourself?: Yes    Bathe or shower yourself?: Yes    Feed yourself? Yes  Do your laundry/housekeeping?: Yes  Manage your money, pay your bills and track your expenses?: No  Make your own meals?: Yes    Do your own shopping?: Yes    Previous Hospitalizations:   Any hospitalizations or ED visits within the last 12 months?: No      Advance Care Planning:   Living will: No    Durable POA for healthcare: No    Advanced directive: No    Five wishes given: Yes      PREVENTIVE SCREENINGS      Cardiovascular Screening:    General: Screening Not Indicated and History Lipid Disorder      Diabetes Screening:     General: Screening Current      Lung Cancer Screening:     General: Screening Not Indicated    Screening, Brief Intervention, and Referral to Treatment (SBIRT)    Screening  Typical number of drinks in a day: 0  Typical number of drinks in a week: 0  Interpretation: Low risk drinking behavior  Single Item Drug Screening:  How often have you used an illegal drug (including marijuana) or a prescription medication for non-medical reasons in the past year? never    Single Item Drug Screen Score: 0  Interpretation: Negative screen for possible drug use disorder    No results found       Physical Exam:     /86 (BP Location: Left arm, Patient Position: Sitting, Cuff Size: Standard)   Pulse 87   Temp 97 9 °F (36 6 °C) (Temporal)   Ht 5' 1" (1 549 m)   Wt 112 kg (247 lb)   SpO2 99%   BMI 46 67 kg/m²     Physical Exam     Scarlett Mathis MD

## 2023-01-12 DIAGNOSIS — E78.2 MIXED HYPERLIPIDEMIA: Primary | ICD-10-CM

## 2023-01-12 DIAGNOSIS — R73.01 IMPAIRED FASTING GLUCOSE: ICD-10-CM

## 2023-03-12 PROBLEM — Z00.00 MEDICARE ANNUAL WELLNESS VISIT, SUBSEQUENT: Status: RESOLVED | Noted: 2021-08-25 | Resolved: 2023-03-12

## 2023-05-11 ENCOUNTER — RA CDI HCC (OUTPATIENT)
Dept: OTHER | Facility: HOSPITAL | Age: 58
End: 2023-05-11

## 2023-05-11 NOTE — PROGRESS NOTES
[facility] [address]  coding opportunities       Chart reviewed, no opportunity found: CHART REVIEWED, NO OPPORTUNITY FOUND        Patients Insurance     Medicare Insurance: Medicare

## 2024-01-15 NOTE — TELEPHONE ENCOUNTER
ACUTE ONSET OF LBP WITH RIGHT LUMBAR RADICULAR PAIN    FULL NOTE DICTATED    PLAN: TFESI'S - RIGHT - L3/L4 AND L4/L5   Sister asked me today after her appointment, if blood work was needed for next appointment?

## 2024-12-05 ENCOUNTER — TELEPHONE (OUTPATIENT)
Age: 59
End: 2024-12-05

## 2024-12-05 NOTE — TELEPHONE ENCOUNTER
Pt sister called requesting records fro brain scan patient had done at [facility]. No records on patient file. Advised to contact [facility].

## 2025-04-17 ENCOUNTER — TELEPHONE (OUTPATIENT)
Age: 60
End: 2025-04-17

## 2025-04-17 NOTE — TELEPHONE ENCOUNTER
from [facility] called in regard to sending referral to us for patient to receive services. Writer informed referral can go to [facility] intake email and provided it to .

## 2025-04-17 NOTE — TELEPHONE ENCOUNTER
Email received from [facility] referring pt for outpatient services, along with a document of pt's clinical information. Document scanned into media. Pt is due to discharge tomorrow, 4/17/25. Writer responded via email to provide an explanation of [facility] policy; a copy of discharge paperwork is required in order to schedule follow up services. If paperwork is not available at this time, please send to the intake email address once it is available.

## 2025-04-17 NOTE — TELEPHONE ENCOUNTER
Nursing facility called inquiring if pysch meds would be able to be managed by PCP until seen on 4/23/25. Advised patient must be seen by PCP in order for medication management to be done.

## 2025-04-18 NOTE — TELEPHONE ENCOUNTER
Spoke with CW from [facility], Emma Valdovinos and scheduled [facility] med mgmt and therapy appt. CW verified pts information and shared pts sister contact information as well, Magdalena for an emergency contact as unsure if pts number is accurate.     [facility] NP med mgmt appt 5/22 at 3:30 pm with Charlotte  [facility] NP therapy appt 6/2 at 1 pm with Bob Quezada MA verified via [facility], ID: [id number]  Medicare part A,B, verified

## 2025-04-18 NOTE — TELEPHONE ENCOUNTER
Received call from [facility] stating that she was placed on hold, but was disconnected. Writer was reviewing chart, and caller was receiving another call from  department. Writer told caller to take the call incase it was previous Writer who was attempting to reconnect.